# Patient Record
Sex: FEMALE | Race: WHITE | NOT HISPANIC OR LATINO | Employment: STUDENT | URBAN - METROPOLITAN AREA
[De-identification: names, ages, dates, MRNs, and addresses within clinical notes are randomized per-mention and may not be internally consistent; named-entity substitution may affect disease eponyms.]

---

## 2023-10-10 ENCOUNTER — APPOINTMENT (OUTPATIENT)
Dept: LAB | Facility: CLINIC | Age: 33
End: 2023-10-10

## 2023-10-10 ENCOUNTER — APPOINTMENT (OUTPATIENT)
Dept: URGENT CARE | Facility: CLINIC | Age: 33
End: 2023-10-10

## 2023-10-10 DIAGNOSIS — Z02.1 PHYSICAL EXAM, PRE-EMPLOYMENT: ICD-10-CM

## 2023-10-10 LAB
MEV IGG SER QL IA: NORMAL
MUV IGG SER QL IA: NORMAL
RUBV IGG SERPL IA-ACNC: 23.7 IU/ML
VZV IGG SER QL IA: NORMAL

## 2023-10-10 PROCEDURE — 36415 COLL VENOUS BLD VENIPUNCTURE: CPT

## 2023-10-10 PROCEDURE — 86787 VARICELLA-ZOSTER ANTIBODY: CPT

## 2023-10-10 PROCEDURE — 86735 MUMPS ANTIBODY: CPT

## 2023-10-10 PROCEDURE — 86480 TB TEST CELL IMMUN MEASURE: CPT

## 2023-10-10 PROCEDURE — 86762 RUBELLA ANTIBODY: CPT

## 2023-10-10 PROCEDURE — 86765 RUBEOLA ANTIBODY: CPT

## 2023-10-11 LAB
GAMMA INTERFERON BACKGROUND BLD IA-ACNC: 0.01 IU/ML
M TB IFN-G BLD-IMP: NEGATIVE
M TB IFN-G CD4+ BCKGRND COR BLD-ACNC: 0.03 IU/ML
M TB IFN-G CD4+ BCKGRND COR BLD-ACNC: 0.03 IU/ML
MITOGEN IGNF BCKGRD COR BLD-ACNC: 6.63 IU/ML

## 2024-11-20 ENCOUNTER — OFFICE VISIT (OUTPATIENT)
Age: 34
End: 2024-11-20
Payer: COMMERCIAL

## 2024-11-20 VITALS
RESPIRATION RATE: 18 BRPM | WEIGHT: 208.6 LBS | OXYGEN SATURATION: 97 % | DIASTOLIC BLOOD PRESSURE: 78 MMHG | SYSTOLIC BLOOD PRESSURE: 116 MMHG | TEMPERATURE: 98.8 F | HEIGHT: 68 IN | BODY MASS INDEX: 31.61 KG/M2 | HEART RATE: 78 BPM

## 2024-11-20 DIAGNOSIS — R00.2 PALPITATIONS: Primary | ICD-10-CM

## 2024-11-20 PROCEDURE — 99203 OFFICE O/P NEW LOW 30 MIN: CPT | Performed by: NURSE PRACTITIONER

## 2024-11-20 PROCEDURE — 93005 ELECTROCARDIOGRAM TRACING: CPT | Performed by: NURSE PRACTITIONER

## 2024-11-20 RX ORDER — SERTRALINE HYDROCHLORIDE 100 MG/1
1 TABLET, FILM COATED ORAL DAILY
COMMUNITY
Start: 2024-11-18

## 2024-11-20 NOTE — PROGRESS NOTES
[facility]        NAME: Mary Hall is a 34 y.o. female  : 1990    MRN: [id number]  DATE: 2024  TIME: 6:05 PM    Assessment and Plan   Palpitations [R00.2]  1. Palpitations          Acute symptomatic worsening in the past 2 days.  EKG showed normal sinus rhythm rightward axis rate of 73 no ST elevation or depression noted and none to compare.  Heart RRR lungs CTA bilaterally.  Discussed with patient she should follow-up with PCP ASAP for further evaluation and treatment which may require some lab work CBC TSH electrolytes Holter monitor and possible echo.  If anything worsens in the meantime she should report to the ED she verbalized understanding    Patient Instructions       Follow up with PCP in 3-5 days.  Proceed to  ER if symptoms worsen.    If tests have been performed at [facility], our office will contact you with results if changes need to be made to the care plan discussed with you at the visit.  You can review your full results on [facility] MyChart.    Chief Complaint     Chief Complaint   Patient presents with    Palpitations     For about 2 days. Pt states it feels like a flutter in her chest. Hx of cardiac arrest in family from father. Was sick about a week ago.         History of Present Illness       Patient is a 34-year-old female arrives with complaints of chest palpitations/fluttering of the chest worsening for about 2 days.  She denies any shortness of breath chest pain lower extremity edema however she does report slight catching of the breath when the fluttering occurs.  She denies any past cardiac history.  She was ill with a viral illness x 1 week ago.  She does report drinking and eating normally and she typically has about 2 cups of coffee a day.  She does have a past medical history of anxiety takes sertraline.  Her health anxiety has increased recently because her father had a sudden cardiac death at the age of 58.  So she has a lot of anxiety over her  heart recently.  She did previously have palpitation she never was worked up by her PCP she was going to try and get in to the PCP however they could not see her tomorrow.  So she came here for at least an EKG. EKG showed normal sinus rhythm rate of 73 rightward axis and no noted ST elevation or depression.  None to compare        Review of Systems   Review of Systems   Constitutional:  Negative for activity change, appetite change, chills, fatigue and fever.   HENT:  Negative for congestion, postnasal drip, rhinorrhea, sneezing and sore throat.    Respiratory:  Negative for cough, chest tightness, shortness of breath and wheezing.    Cardiovascular:  Positive for palpitations. Negative for chest pain.   Gastrointestinal:  Negative for abdominal pain, constipation, diarrhea, nausea and vomiting.   Musculoskeletal:  Negative for arthralgias and myalgias.   Skin:  Negative for color change, pallor and rash.   Neurological:  Negative for dizziness, weakness, light-headedness and headaches.   Hematological:  Negative for adenopathy.   Psychiatric/Behavioral:  Negative for agitation and confusion.          Current Medications       Current Outpatient Medications:     sertraline (ZOLOFT) 100 mg tablet, Take 1 tablet by mouth in the morning, Disp: , Rfl:     Current Allergies     Allergies as of 11/20/2024    (No Known Allergies)            The following portions of the patient's history were reviewed and updated as appropriate: allergies, current medications, past family history, past medical history, past social history, past surgical history and problem list.     History reviewed. No pertinent past medical history.    History reviewed. No pertinent surgical history.    Family History   Problem Relation Age of Onset    Heart disease Father          Medications have been verified.        Objective   /78 (BP Location: Right arm, Patient Position: Sitting, Cuff Size: Large)   Pulse 78   Temp 98.8 °F (37.1 °C)  "(Tympanic)   Resp 18   Ht 5' 8\" (1.727 m)   Wt 94.6 kg (208 lb 9.6 oz)   SpO2 97%   BMI 31.72 kg/m²   No LMP recorded.       Physical Exam     Physical Exam  Vitals and nursing note reviewed.   Constitutional:       General: She is not in acute distress.     Appearance: Normal appearance. She is not ill-appearing or diaphoretic.   HENT:      Head: Normocephalic and atraumatic.      Nose: No congestion or rhinorrhea.   Eyes:      General: No scleral icterus.        Right eye: No discharge.         Left eye: No discharge.   Cardiovascular:      Rate and Rhythm: Normal rate and regular rhythm.   Pulmonary:      Effort: Pulmonary effort is normal. No respiratory distress.      Breath sounds: Normal breath sounds. No stridor. No wheezing, rhonchi or rales.   Musculoskeletal:         General: Normal range of motion.      Cervical back: Normal range of motion.   Skin:     Coloration: Skin is not jaundiced or pale.      Findings: No bruising, erythema or rash.   Neurological:      General: No focal deficit present.      Mental Status: She is alert and oriented to person, place, and time.   Psychiatric:         Mood and Affect: Mood normal.         Behavior: Behavior normal.         Thought Content: Thought content normal.         Judgment: Judgment normal.                   "

## 2024-11-21 LAB
ATRIAL RATE: 73 BPM
P AXIS: 18 DEGREES
PR INTERVAL: 172 MS
QRS AXIS: 91 DEGREES
QRSD INTERVAL: 92 MS
QT INTERVAL: 374 MS
QTC INTERVAL: 412 MS
T WAVE AXIS: 66 DEGREES
VENTRICULAR RATE: 73 BPM

## 2024-11-21 PROCEDURE — 93010 ELECTROCARDIOGRAM REPORT: CPT | Performed by: INTERNAL MEDICINE
